# Patient Record
Sex: MALE | Race: WHITE | NOT HISPANIC OR LATINO | URBAN - METROPOLITAN AREA
[De-identification: names, ages, dates, MRNs, and addresses within clinical notes are randomized per-mention and may not be internally consistent; named-entity substitution may affect disease eponyms.]

---

## 2017-03-16 ENCOUNTER — INPATIENT (INPATIENT)
Facility: HOSPITAL | Age: 52
LOS: 0 days | Discharge: HOME | End: 2017-03-17
Attending: HOSPITALIST | Admitting: INTERNAL MEDICINE
Payer: COMMERCIAL

## 2017-03-16 VITALS
DIASTOLIC BLOOD PRESSURE: 96 MMHG | RESPIRATION RATE: 16 BRPM | HEART RATE: 72 BPM | SYSTOLIC BLOOD PRESSURE: 143 MMHG | TEMPERATURE: 98 F | OXYGEN SATURATION: 99 %

## 2017-03-16 LAB
ALBUMIN SERPL ELPH-MCNC: 4.5 G/DL — SIGNIFICANT CHANGE UP (ref 3.3–5)
ALP SERPL-CCNC: 71 U/L — SIGNIFICANT CHANGE UP (ref 40–120)
ALT FLD-CCNC: 36 U/L — SIGNIFICANT CHANGE UP (ref 12–78)
ANION GAP SERPL CALC-SCNC: 11 MMOL/L — SIGNIFICANT CHANGE UP (ref 5–17)
AST SERPL-CCNC: 32 U/L — SIGNIFICANT CHANGE UP (ref 15–37)
BASOPHILS # BLD AUTO: 0.1 K/UL — SIGNIFICANT CHANGE UP (ref 0–0.2)
BASOPHILS NFR BLD AUTO: 1.2 % — SIGNIFICANT CHANGE UP (ref 0–2)
BILIRUB SERPL-MCNC: 1.2 MG/DL — SIGNIFICANT CHANGE UP (ref 0.2–1.2)
BUN SERPL-MCNC: 18 MG/DL — SIGNIFICANT CHANGE UP (ref 7–23)
CALCIUM SERPL-MCNC: 8.9 MG/DL — SIGNIFICANT CHANGE UP (ref 8.5–10.1)
CHLORIDE SERPL-SCNC: 101 MMOL/L — SIGNIFICANT CHANGE UP (ref 96–108)
CO2 SERPL-SCNC: 27 MMOL/L — SIGNIFICANT CHANGE UP (ref 22–31)
CREAT SERPL-MCNC: 1.1 MG/DL — SIGNIFICANT CHANGE UP (ref 0.5–1.3)
EOSINOPHIL # BLD AUTO: 0.5 K/UL — SIGNIFICANT CHANGE UP (ref 0–0.5)
EOSINOPHIL NFR BLD AUTO: 4.7 % — SIGNIFICANT CHANGE UP (ref 0–6)
GLUCOSE SERPL-MCNC: 96 MG/DL — SIGNIFICANT CHANGE UP (ref 70–99)
HCT VFR BLD CALC: 50.4 % — HIGH (ref 39–50)
HGB BLD-MCNC: 17.4 G/DL — HIGH (ref 13–17)
LYMPHOCYTES # BLD AUTO: 2.5 K/UL — SIGNIFICANT CHANGE UP (ref 1–3.3)
LYMPHOCYTES # BLD AUTO: 24.8 % — SIGNIFICANT CHANGE UP (ref 13–44)
MCHC RBC-ENTMCNC: 30.9 PG — SIGNIFICANT CHANGE UP (ref 27–34)
MCHC RBC-ENTMCNC: 34.5 GM/DL — SIGNIFICANT CHANGE UP (ref 32–36)
MCV RBC AUTO: 89.7 FL — SIGNIFICANT CHANGE UP (ref 80–100)
MONOCYTES # BLD AUTO: 0.8 K/UL — SIGNIFICANT CHANGE UP (ref 0–0.9)
MONOCYTES NFR BLD AUTO: 8.3 % — SIGNIFICANT CHANGE UP (ref 2–14)
NEUTROPHILS # BLD AUTO: 6.1 K/UL — SIGNIFICANT CHANGE UP (ref 1.8–7.4)
NEUTROPHILS NFR BLD AUTO: 61 % — SIGNIFICANT CHANGE UP (ref 43–77)
PLATELET # BLD AUTO: 283 K/UL — SIGNIFICANT CHANGE UP (ref 150–400)
POTASSIUM SERPL-MCNC: 3.3 MMOL/L — LOW (ref 3.5–5.3)
POTASSIUM SERPL-SCNC: 3.3 MMOL/L — LOW (ref 3.5–5.3)
PROT SERPL-MCNC: 8.2 GM/DL — SIGNIFICANT CHANGE UP (ref 6–8.3)
RBC # BLD: 5.62 M/UL — SIGNIFICANT CHANGE UP (ref 4.2–5.8)
RBC # FLD: 11.2 % — SIGNIFICANT CHANGE UP (ref 10.3–14.5)
SODIUM SERPL-SCNC: 139 MMOL/L — SIGNIFICANT CHANGE UP (ref 135–145)
TROPONIN I SERPL-MCNC: <0.015 NG/ML — SIGNIFICANT CHANGE UP (ref 0.01–0.04)
WBC # BLD: 10 K/UL — SIGNIFICANT CHANGE UP (ref 3.8–10.5)
WBC # FLD AUTO: 10 K/UL — SIGNIFICANT CHANGE UP (ref 3.8–10.5)

## 2017-03-16 PROCEDURE — 93010 ELECTROCARDIOGRAM REPORT: CPT

## 2017-03-16 PROCEDURE — 99285 EMERGENCY DEPT VISIT HI MDM: CPT

## 2017-03-16 PROCEDURE — 71020: CPT | Mod: 26

## 2017-03-16 RX ORDER — POTASSIUM CHLORIDE 20 MEQ
40 PACKET (EA) ORAL ONCE
Qty: 0 | Refills: 0 | Status: COMPLETED | OUTPATIENT
Start: 2017-03-16 | End: 2017-03-16

## 2017-03-16 RX ADMIN — Medication 40 MILLIEQUIVALENT(S): at 23:48

## 2017-03-16 NOTE — ED PROVIDER NOTE - PROGRESS NOTE DETAILS
RANJITM: Spoke with dr adler covering for dr king. recommends admission to tele since pts symptoms are similar to prior MI. Initial ecg and trop normal

## 2017-03-16 NOTE — ED PROVIDER NOTE - OBJECTIVE STATEMENT
50 y/o male with a h/o CAD with 3 stents by Dr. Oneal (cardio), on metoprolol, anxiety on xanax PRN, c/o palpitations, frequent today. Pt reports similar sx in the past. He states he was anxious today due to his palpitations, had a near-syncopal episode. No CP, fevers, cough, or recent illnesses. Last stress test was 1 year ago, EF of 48-49%. Dr. Charles (cardio). 50 y/o male with a h/o CAD with 3 stents by Dr. Oneal (cardio), on metoprolol, anxiety on xanax PRN, c/o frequent episodes of palpitations today. Pt reports similar sx in the past. He states he was anxious today due to his palpitations, had a near-syncopal episode. No CP, fevers, cough, or recent illnesses. Last stress test was 1 year ago, EF of 48-49%. Dr. Charles (cardio).

## 2017-03-16 NOTE — ED PROVIDER NOTE - NS ED MD SCRIBE ATTENDING SCRIBE SECTIONS
RESULTS/DISPOSITION/PAST MEDICAL/SURGICAL/SOCIAL HISTORY/PHYSICAL EXAM/PROGRESS NOTE/HISTORY OF PRESENT ILLNESS/REVIEW OF SYSTEMS

## 2017-03-16 NOTE — ED ADULT NURSE NOTE - OBJECTIVE STATEMENT
Pt presents to ED c/o palpitations, anxiety, light headedness, SOB, and nausea since earlier today. Pt states he has hx of stents x 3 and HTN. Denies chest pain, vomiting, diarrhea, and recent infections/travel. Examined by MD Linder. Will continue to monitor.

## 2017-03-17 VITALS
SYSTOLIC BLOOD PRESSURE: 124 MMHG | RESPIRATION RATE: 17 BRPM | OXYGEN SATURATION: 96 % | HEART RATE: 60 BPM | DIASTOLIC BLOOD PRESSURE: 68 MMHG | TEMPERATURE: 98 F

## 2017-03-17 LAB
ANION GAP SERPL CALC-SCNC: 8 MMOL/L — SIGNIFICANT CHANGE UP (ref 5–17)
BUN SERPL-MCNC: 13 MG/DL — SIGNIFICANT CHANGE UP (ref 7–23)
CALCIUM SERPL-MCNC: 8.8 MG/DL — SIGNIFICANT CHANGE UP (ref 8.5–10.1)
CHLORIDE SERPL-SCNC: 105 MMOL/L — SIGNIFICANT CHANGE UP (ref 96–108)
CHOLEST SERPL-MCNC: 142 MG/DL — SIGNIFICANT CHANGE UP (ref 10–199)
CO2 SERPL-SCNC: 29 MMOL/L — SIGNIFICANT CHANGE UP (ref 22–31)
CREAT SERPL-MCNC: 1.15 MG/DL — SIGNIFICANT CHANGE UP (ref 0.5–1.3)
GLUCOSE SERPL-MCNC: 101 MG/DL — HIGH (ref 70–99)
HDLC SERPL-MCNC: 45 MG/DL — SIGNIFICANT CHANGE UP (ref 40–125)
LIPID PNL WITH DIRECT LDL SERPL: 80 MG/DL — SIGNIFICANT CHANGE UP
POTASSIUM SERPL-MCNC: 4.7 MMOL/L — SIGNIFICANT CHANGE UP (ref 3.5–5.3)
POTASSIUM SERPL-SCNC: 4.7 MMOL/L — SIGNIFICANT CHANGE UP (ref 3.5–5.3)
SODIUM SERPL-SCNC: 142 MMOL/L — SIGNIFICANT CHANGE UP (ref 135–145)
TOTAL CHOLESTEROL/HDL RATIO MEASUREMENT: 3.2 RATIO — LOW (ref 3.4–9.6)
TRIGL SERPL-MCNC: 87 MG/DL — SIGNIFICANT CHANGE UP (ref 10–149)
TROPONIN I SERPL-MCNC: <0.015 NG/ML — SIGNIFICANT CHANGE UP (ref 0.01–0.04)
TROPONIN I SERPL-MCNC: <0.015 NG/ML — SIGNIFICANT CHANGE UP (ref 0.01–0.04)
TSH SERPL-MCNC: 1.2 UIU/ML — SIGNIFICANT CHANGE UP (ref 0.36–3.74)

## 2017-03-17 PROCEDURE — 93010 ELECTROCARDIOGRAM REPORT: CPT

## 2017-03-17 RX ORDER — LISINOPRIL 2.5 MG/1
1 TABLET ORAL
Qty: 0 | Refills: 0 | COMMUNITY

## 2017-03-17 RX ORDER — ASPIRIN/CALCIUM CARB/MAGNESIUM 324 MG
81 TABLET ORAL DAILY
Qty: 0 | Refills: 0 | Status: DISCONTINUED | OUTPATIENT
Start: 2017-03-17 | End: 2017-03-17

## 2017-03-17 RX ORDER — ACETAMINOPHEN 500 MG
650 TABLET ORAL EVERY 6 HOURS
Qty: 0 | Refills: 0 | Status: DISCONTINUED | OUTPATIENT
Start: 2017-03-17 | End: 2017-03-17

## 2017-03-17 RX ORDER — CLOPIDOGREL BISULFATE 75 MG/1
1 TABLET, FILM COATED ORAL
Qty: 0 | Refills: 0 | COMMUNITY

## 2017-03-17 RX ORDER — ALPRAZOLAM 0.25 MG
1 TABLET ORAL
Qty: 0 | Refills: 0 | COMMUNITY

## 2017-03-17 RX ORDER — METOPROLOL TARTRATE 50 MG
50 TABLET ORAL AT BEDTIME
Qty: 0 | Refills: 0 | Status: DISCONTINUED | OUTPATIENT
Start: 2017-03-17 | End: 2017-03-17

## 2017-03-17 RX ORDER — ONDANSETRON 8 MG/1
4 TABLET, FILM COATED ORAL EVERY 6 HOURS
Qty: 0 | Refills: 0 | Status: DISCONTINUED | OUTPATIENT
Start: 2017-03-17 | End: 2017-03-17

## 2017-03-17 RX ORDER — ASPIRIN/CALCIUM CARB/MAGNESIUM 324 MG
1 TABLET ORAL
Qty: 0 | Refills: 0 | COMMUNITY

## 2017-03-17 RX ORDER — CLOPIDOGREL BISULFATE 75 MG/1
75 TABLET, FILM COATED ORAL DAILY
Qty: 0 | Refills: 0 | Status: DISCONTINUED | OUTPATIENT
Start: 2017-03-17 | End: 2017-03-17

## 2017-03-17 RX ORDER — METOPROLOL TARTRATE 50 MG
1 TABLET ORAL
Qty: 0 | Refills: 0 | COMMUNITY

## 2017-03-17 RX ORDER — ALPRAZOLAM 0.25 MG
0.25 TABLET ORAL THREE TIMES A DAY
Qty: 0 | Refills: 0 | Status: DISCONTINUED | OUTPATIENT
Start: 2017-03-17 | End: 2017-03-17

## 2017-03-17 RX ORDER — ATORVASTATIN CALCIUM 80 MG/1
1 TABLET, FILM COATED ORAL
Qty: 0 | Refills: 0 | COMMUNITY

## 2017-03-17 RX ORDER — ATORVASTATIN CALCIUM 80 MG/1
20 TABLET, FILM COATED ORAL AT BEDTIME
Qty: 0 | Refills: 0 | Status: DISCONTINUED | OUTPATIENT
Start: 2017-03-17 | End: 2017-03-17

## 2017-03-17 RX ORDER — LISINOPRIL 2.5 MG/1
10 TABLET ORAL DAILY
Qty: 0 | Refills: 0 | Status: DISCONTINUED | OUTPATIENT
Start: 2017-03-17 | End: 2017-03-17

## 2017-03-17 RX ORDER — SODIUM CHLORIDE 9 MG/ML
1000 INJECTION INTRAMUSCULAR; INTRAVENOUS; SUBCUTANEOUS
Qty: 0 | Refills: 0 | Status: DISCONTINUED | OUTPATIENT
Start: 2017-03-17 | End: 2017-03-17

## 2017-03-17 RX ADMIN — CLOPIDOGREL BISULFATE 75 MILLIGRAM(S): 75 TABLET, FILM COATED ORAL at 11:31

## 2017-03-17 RX ADMIN — Medication 50 MILLIGRAM(S): at 00:20

## 2017-03-17 RX ADMIN — ATORVASTATIN CALCIUM 20 MILLIGRAM(S): 80 TABLET, FILM COATED ORAL at 00:20

## 2017-03-17 RX ADMIN — LISINOPRIL 10 MILLIGRAM(S): 2.5 TABLET ORAL at 06:14

## 2017-03-17 RX ADMIN — Medication 81 MILLIGRAM(S): at 11:31

## 2017-03-17 RX ADMIN — SODIUM CHLORIDE 100 MILLILITER(S): 9 INJECTION INTRAMUSCULAR; INTRAVENOUS; SUBCUTANEOUS at 01:20

## 2017-03-17 NOTE — DISCHARGE NOTE ADULT - PATIENT PORTAL LINK FT
“You can access the FollowHealth Patient Portal, offered by [facility], by registering with the following website: [email or web address]”

## 2017-03-17 NOTE — H&P ADULT - NSHPPHYSICALEXAM_GEN_ALL_CORE
Vital Signs Last 24 Hrs  T(C): 36.4, Max: 36.4 (03-16 @ 20:30)  T(F): 97.6, Max: 97.6 (03-16 @ 20:30)  HR: 64 (64 - 72)  BP: 131/83 (131/83 - 143/96)  BP(mean): --  RR: 17 (16 - 17)  SpO2: 99% (99% - 99%)    GEN: appears comfortable  Neuro: AAOx3, nonfocal  HEENT: NC/AT, EOMI  Neck: no thyroidmegaly, no JVD  Cardiovascular: S1S2 present, regular rhythm, no murmur  Respiratory: breath sounds normal bilaterally, no wheezing, no rales, no rhonchi  Gastrointestinal: bowel sounds normal, soft, no abdominal tenderness  Musculoskeletal: no muscle tenderness  Extremities: No edema  Skin: No rash

## 2017-03-17 NOTE — CONSULT NOTE ADULT - ASSESSMENT
1. Palpitations. Likely due to PVC's which are chronic exacerbated by hypokalemia  2. CAD stable no evidence ACS  3. Anxiety  4. HTN good control  Plan: Patient stable for discharge on usual meds. follow up office.   thank you

## 2017-03-17 NOTE — H&P ADULT - ASSESSMENT
51 y.o. male with PMH CAD s/p MI s/p stents x3 (2014), HTN, HLD, anxiety presents with palpitations. Pt states palpitations that is associated with lightheadedness and dizziness.     #palpitations, near syncope  #dehydration  -admit to tele  -KIM  -check orthostatic vitals  -cont statin  -cardio consult, Dr Gu    #CAD s/p MI s/p stents  -cont meds    #hypokalemia  -replete potassium and monitor  -EKG: HR 85, NSR, T flattening III, AVF    #anxiety  -prn xanax

## 2017-03-17 NOTE — H&P ADULT - HISTORY OF PRESENT ILLNESS
51 y.o. male with PMH CAD s/p MI s/p stents x3 (2014), HTN, HLD, anxiety presents with palpitations. Pt states palpitations that is associated with lightheadedness and dizziness. States this AM, his HR 40s (he reports HR 40s-50s baseline). Pt reports drinking 3 beers last night and unsure if he took his PM meds. Denies any fever, CP, SOB, abd pain, diarrhea. Reports drinking 1/2 cup decaf and 1/2 cup regular coffee daily, unchanged from prior. Pt reports HR 70s-80s is high for him.    PMH: as above  PSH: right shoulder repair, stents  Family Hx: noncontrib to current presentation  Social Hx: former smoker, quit 2 yr ago, social EtOH, occupation - works for [employer]  ROS: as per HPI

## 2017-03-17 NOTE — DISCHARGE NOTE ADULT - MEDICATION SUMMARY - MEDICATIONS TO TAKE
I will START or STAY ON the medications listed below when I get home from the hospital:    aspirin 81 mg oral tablet, chewable  -- 1 tab(s) by mouth once a day  -- Indication: For CAD (coronary artery disease)    lisinopril 10 mg oral tablet  -- 1 tab(s) by mouth once a day  -- Indication: For htn    atorvastatin 20 mg oral tablet  -- 1 tab(s) by mouth once a day (at bedtime)  -- Indication: For dld    Plavix 75 mg oral tablet  -- 1 tab(s) by mouth once a day  -- Indication: For CAD (coronary artery disease)    Xanax 0.25 mg oral tablet  -- 1 tab(s) by mouth 3 times a day, As Needed  -- Indication: For anxiety    Toprol-XL 50 mg oral tablet, extended release  -- 1 tab(s) by mouth once a day (at bedtime)  -- Indication: For htn

## 2017-03-17 NOTE — DISCHARGE NOTE ADULT - CARE PLAN
Principal Discharge DX:	Palpitations  Goal:	activity as tolerated  Instructions for follow-up, activity and diet:	as above

## 2017-03-17 NOTE — DISCHARGE NOTE ADULT - HOSPITAL COURSE
Patient is a 51y old  Male who presents with a chief complaint of Pt states he was feeling an irregular heartbeat and his blood pressure was high. (17 Mar 2017 01:23)      SUBJECTIVE:   HPI:  51 y.o. male with PMH CAD s/p MI s/p stents x3 (2014), HTN, HLD, anxiety presents with palpitations. Pt states palpitations that is associated with lightheadedness and dizziness. States this AM, his HR 40s (he reports HR 40s-50s baseline). Pt reports drinking 3 beers last night and unsure if he took his PM meds. Denies any fever, CP, SOB, abd pain, diarrhea. Reports drinking 1/2 cup decaf and 1/2 cup regular coffee daily, unchanged from prior. Pt reports HR 70s-80s is high for him.  EKG wnl. CE neg x 3. No signs of ischemia. No events while on tele. PVCs likely from chronic hypokalemia. Plan to cont DAPT and home emds and dc to home.  PMH: as above  PSH: right shoulder repair, stents  Family Hx: noncontrib to current presentation  Social Hx: former smoker, quit 2 yr ago, social EtOH, occupation - works for [employer]  ROS: as per HPI (17 Mar 2017 00:25)              Vital Signs Last 24 Hrs  T(C): 36.7, Max: 36.7 (03-17 @ 10:30)  T(F): 98.1, Max: 98.1 (03-17 @ 10:30)  HR: 60 (50 - 72)  BP: 124/68 (124/68 - 144/95)  BP(mean): --  RR: 17 (16 - 18)  SpO2: 96% (96% - 99%)    I&O's Summary    I & Os for current day (as of 17 Mar 2017 13:33)  =============================================  IN: 979 ml / OUT: 0 ml / NET: 979 ml      CAPILLARY BLOOD GLUCOSE      PHYSICAL EXAM:    Constitutional: NAD, awake and alert, well-developed  HEENT: PERR, EOMI, Normal Hearing, MMM  Neck: Soft and supple, No LAD, No JVD  Respiratory: Breath sounds are clear bilaterally, No wheezing, rales or rhonchi  Cardiovascular: S1 and S2, regular rate and rhythm, no Murmurs, gallops or rubs  Gastrointestinal: Bowel Sounds present, soft, nontender, nondistended, no guarding, no rebound  Extremities: No peripheral edema  Vascular: 2+ peripheral pulses  Neurological: A/O x 3, no focal deficits  Musculoskeletal: 5/5 strength b/l upper and lower extremities  Skin: No rashes    MEDICATIONS:  MEDICATIONS  (STANDING):  metoprolol succinate ER 50milliGRAM(s) Oral at bedtime  atorvastatin 20milliGRAM(s) Oral at bedtime  sodium chloride 0.9%. 1000milliLiter(s) IV Continuous <Continuous>  aspirin  chewable 81milliGRAM(s) Oral daily  lisinopril 10milliGRAM(s) Oral daily  clopidogrel Tablet 75milliGRAM(s) Oral daily      LABS: All Labs Reviewed:                        17.4   10.0  )-----------( 283      ( 16 Mar 2017 19:50 )             50.4     17 Mar 2017 06:00    142    |  105    |  13     ----------------------------<  101    4.7     |  29     |  1.15     Ca    8.8        17 Mar 2017 06:00    TPro  8.2    /  Alb  4.5    /  TBili  1.2    /  DBili  x      /  AST  32     /  ALT  36     /  AlkPhos  71     16 Mar 2017 19:50      CARDIAC MARKERS ( 17 Mar 2017 06:00 )  <0.015 ng/mL / x     / x     / x     / x      CARDIAC MARKERS ( 17 Mar 2017 00:00 )  <0.015 ng/mL / x     / x     / x     / x      CARDIAC MARKERS ( 16 Mar 2017 19:50 )  <0.015 ng/mL / x     / x     / x     / x              Blood Culture:     RADIOLOGY/EKG:

## 2017-03-17 NOTE — CONSULT NOTE ADULT - SUBJECTIVE AND OBJECTIVE BOX
Patient is a 51y old  Male who presents with a chief complaint of Pt states he was feeling an irregular heartbeat and his blood pressure was high. (17 Mar 2017 01:23)      HPI:  51 y.o. male with PMH CAD s/p MI s/p stents x3 (2014), HTN, HLD, anxiety presents with palpitations. Pt states palpitations that is associated with lightheadedness and dizziness. States this AM, his HR 40s (he reports HR 40s-50s baseline). Pt reports drinking 3 beers last night and unsure if he took his PM meds. Denies any fever, CP, SOB, abd pain, diarrhea. Reports drinking 1/2 cup decaf and 1/2 cup regular coffee daily, unchanged from prior. Pt reports HR 70s-80s is high for him.  As above. patient well known to me . Has chronic intermittent palpitations and PVC. Prominent episode yesterday assoc with hypokalemia. no chest pain. Last nuc stress test aprrox one year ago.  PMH: as above  PSH: right shoulder repair, stents  Family Hx: noncontrib to current presentation  Social Hx: former smoker, quit 2 yr ago, social EtOH, occupation - works for [employer]  ROS: as per HPI (17 Mar 2017 00:25)      PAST MEDICAL & SURGICAL HISTORY:  CAD (coronary artery disease)      PREVIOUS DIAGNOSTIC TESTING:      ECHO  FINDINGS:    STRESS  FINDINGS:    CATHETERIZATION  FINDINGS:    MEDICATIONS  (STANDING):  metoprolol succinate ER 50milliGRAM(s) Oral at bedtime  atorvastatin 20milliGRAM(s) Oral at bedtime  sodium chloride 0.9%. 1000milliLiter(s) IV Continuous <Continuous>  aspirin  chewable 81milliGRAM(s) Oral daily  lisinopril 10milliGRAM(s) Oral daily  clopidogrel Tablet 75milliGRAM(s) Oral daily    MEDICATIONS  (PRN):  acetaminophen   Tablet 650milliGRAM(s) Oral every 6 hours PRN For Temp greater than 38 C (100.4 F)  ondansetron Injectable 4milliGRAM(s) IV Push every 6 hours PRN Nausea  ALPRAZolam 0.25milliGRAM(s) Oral three times a day PRN anxiety      FAMILY HISTORY:      SOCIAL HISTORY:  ***    REVIEW OF SYSTEM:  Pertinent items are noted in HPI.  Constitutional neg  Eyes:    Ears, nose, mouth,   throat, and face:    Neck:   Respiratory:  no dyspnea or cough    Cardiovascular:    Gastrointestinal:no nasea or vomiting  Genitourinary:     Hematologic/lymphatic:   Musculoskeletal:   Neurological:   Behavioral/Psych:        Vital Signs Last 24 Hrs  T(C): 36.6, Max: 36.6 (03-17 @ 06:00)  T(F): 97.8, Max: 97.8 (03-17 @ 06:00)  HR: 50 (50 - 72)  BP: 132/84 (131/83 - 144/95)  BP(mean): --  RR: 17 (16 - 18)  SpO2: 98% (97% - 99%)    I&O's Summary    I & Os for current day (as of 17 Mar 2017 08:03)  =============================================  IN: 979 ml / OUT: 0 ml / NET: 979 ml    PHYSICAL EXAM  General Appearance: cooperative, no acute distress,   HEENT: PERRL, conjunctiva clear, EOM's intact,   Neck: Supple, , no adenopathy, thyroid: not enlarged, no carotid bruit or JVD  Back: Symmetric, no  tenderness,no soft tissue tenderness  Lungs: Clear to auscultation bilaterally,no adventitious breath sounds, normal   expiratory phase  Heart: Regular rate and rhythm, S1, S2 normal, no murmur,   Abdomen: Soft, non-tender, bowel sounds active , no hepatosplenomegaly  Extremities: no cyanosis or edema, no joint swelling  Skin: Skin color, texture normal, no rashes   Neurologic: Alert and oriented X3 ,        INTERPRETATION OF TELEMETRY:    ECG:Sinus blane. old [facility]         LABS:                          17.4   10.0  )-----------( 283      ( 16 Mar 2017 19:50 )             50.4     17 Mar 2017 06:00    142    |  105    |  13     ----------------------------<  101    4.7     |  29     |  1.15     Ca    8.8        17 Mar 2017 06:00    TPro  8.2    /  Alb  4.5    /  TBili  1.2    /  DBili  x      /  AST  32     /  ALT  36     /  AlkPhos  71     16 Mar 2017 19:50    CARDIAC MARKERS ( 17 Mar 2017 06:00 )  <0.015 ng/mL / x     / x     / x     / x      CARDIAC MARKERS ( 17 Mar 2017 00:00 )  <0.015 ng/mL / x     / x     / x     / x      CARDIAC MARKERS ( 16 Mar 2017 19:50 )  <0.015 ng/mL / x     / x     / x     / x                        RADIOLOGY & ADDITIONAL STUDIES:    IMPRESSION:    PLAN:

## 2017-03-20 DIAGNOSIS — I49.3 VENTRICULAR PREMATURE DEPOLARIZATION: ICD-10-CM

## 2017-03-20 DIAGNOSIS — Z95.5 PRESENCE OF CORONARY ANGIOPLASTY IMPLANT AND GRAFT: ICD-10-CM

## 2017-03-20 DIAGNOSIS — E87.6 HYPOKALEMIA: ICD-10-CM

## 2017-03-20 DIAGNOSIS — E86.0 DEHYDRATION: ICD-10-CM

## 2017-03-20 DIAGNOSIS — E78.5 HYPERLIPIDEMIA, UNSPECIFIED: ICD-10-CM

## 2017-03-20 DIAGNOSIS — I25.10 ATHEROSCLEROTIC HEART DISEASE OF NATIVE CORONARY ARTERY WITHOUT ANGINA PECTORIS: ICD-10-CM

## 2017-03-20 DIAGNOSIS — Z87.891 PERSONAL HISTORY OF NICOTINE DEPENDENCE: ICD-10-CM

## 2017-03-20 DIAGNOSIS — I25.2 OLD MYOCARDIAL INFARCTION: ICD-10-CM

## 2017-03-20 DIAGNOSIS — R00.2 PALPITATIONS: ICD-10-CM

## 2017-03-20 DIAGNOSIS — F41.9 ANXIETY DISORDER, UNSPECIFIED: ICD-10-CM

## 2017-03-20 DIAGNOSIS — I10 ESSENTIAL (PRIMARY) HYPERTENSION: ICD-10-CM

## 2019-10-06 ENCOUNTER — EMERGENCY (EMERGENCY)
Facility: HOSPITAL | Age: 54
LOS: 0 days | Discharge: HOME | End: 2019-10-07
Attending: EMERGENCY MEDICINE
Payer: COMMERCIAL

## 2019-10-06 VITALS — HEIGHT: 71 IN | WEIGHT: 214.95 LBS

## 2019-10-06 DIAGNOSIS — I25.10 ATHEROSCLEROTIC HEART DISEASE OF NATIVE CORONARY ARTERY WITHOUT ANGINA PECTORIS: ICD-10-CM

## 2019-10-06 DIAGNOSIS — R20.2 PARESTHESIA OF SKIN: ICD-10-CM

## 2019-10-06 DIAGNOSIS — F41.9 ANXIETY DISORDER, UNSPECIFIED: ICD-10-CM

## 2019-10-06 DIAGNOSIS — I10 ESSENTIAL (PRIMARY) HYPERTENSION: ICD-10-CM

## 2019-10-06 DIAGNOSIS — Z79.82 LONG TERM (CURRENT) USE OF ASPIRIN: ICD-10-CM

## 2019-10-06 DIAGNOSIS — Z79.899 OTHER LONG TERM (CURRENT) DRUG THERAPY: ICD-10-CM

## 2019-10-06 DIAGNOSIS — Z95.5 PRESENCE OF CORONARY ANGIOPLASTY IMPLANT AND GRAFT: ICD-10-CM

## 2019-10-06 PROCEDURE — 93010 ELECTROCARDIOGRAM REPORT: CPT

## 2019-10-06 PROCEDURE — 84484 ASSAY OF TROPONIN QUANT: CPT

## 2019-10-06 PROCEDURE — 80053 COMPREHEN METABOLIC PANEL: CPT

## 2019-10-06 PROCEDURE — 36415 COLL VENOUS BLD VENIPUNCTURE: CPT

## 2019-10-06 PROCEDURE — 99284 EMERGENCY DEPT VISIT MOD MDM: CPT

## 2019-10-06 PROCEDURE — 85610 PROTHROMBIN TIME: CPT

## 2019-10-06 PROCEDURE — 93005 ELECTROCARDIOGRAM TRACING: CPT

## 2019-10-06 PROCEDURE — 85730 THROMBOPLASTIN TIME PARTIAL: CPT

## 2019-10-06 PROCEDURE — 85027 COMPLETE CBC AUTOMATED: CPT

## 2019-10-06 PROCEDURE — 82962 GLUCOSE BLOOD TEST: CPT

## 2019-10-06 NOTE — ED PROVIDER NOTE - PROGRESS NOTE DETAILS
pt and spouse told of results.   pt states feels better and denies any current symptoms.   pt states that he prefers to be d.c home now and will f/u with PMD in the AM.   prefers not to have repeat CE in ED.

## 2019-10-06 NOTE — ED PROVIDER NOTE - NSFOLLOWUPINSTRUCTIONS_ED_ALL_ED_FT
please follow up with your doctor in 2-3 days.   continue with medications as prescribed.    return to ED for any concerns please follow up with your doctor in 1-2 days.   continue with medications as prescribed.    return to ED for any concerns

## 2019-10-06 NOTE — ED ADULT TRIAGE NOTE - CHIEF COMPLAINT QUOTE
hypertension tonight, 180/91 on home monitor. denies chest pain or discomfort. states he feels shaky. no respiratory distress.  history of 3 stents 5 years ago.   home medications: plavix, atorvastatin, lisinopril, bystolic

## 2019-10-06 NOTE — ED PROVIDER NOTE - OBJECTIVE STATEMENT
55 y/o male in ED c/o numbness and tingling to lips, hands and feet tonight after eating carbs.    pt states took his BP at home and found to be high.   pt states became anxious followed by increasing BP.   pt states has been told be his PMD to avoid carbs as he usually gets GI upset with intake.   pt states usually gets epigastric fullness with carb intake and had bread and pasta tonight.    pt denies any fever, HA, cp, sob, n/v/d.    no sick contacts or recent travel.    states taking his BP meds as prescribed.

## 2019-10-06 NOTE — ED PROVIDER NOTE - CLINICAL SUMMARY MEDICAL DECISION MAKING FREE TEXT BOX
pt with episode of numbness and tingling to lips, hands, and feet tonight.   h/o CAD and stents.    elevated BP.   will check EKG, labs, observe and reeval.  possible anxiety.

## 2019-10-07 VITALS
OXYGEN SATURATION: 100 % | SYSTOLIC BLOOD PRESSURE: 168 MMHG | HEART RATE: 62 BPM | DIASTOLIC BLOOD PRESSURE: 97 MMHG | TEMPERATURE: 98 F | RESPIRATION RATE: 16 BRPM

## 2019-10-07 PROBLEM — I25.10 ATHEROSCLEROTIC HEART DISEASE OF NATIVE CORONARY ARTERY WITHOUT ANGINA PECTORIS: Chronic | Status: ACTIVE | Noted: 2017-03-16

## 2019-10-07 LAB
ALBUMIN SERPL ELPH-MCNC: 3.9 G/DL — SIGNIFICANT CHANGE UP (ref 3.3–5)
ALP SERPL-CCNC: 69 U/L — SIGNIFICANT CHANGE UP (ref 40–120)
ALT FLD-CCNC: 35 U/L — SIGNIFICANT CHANGE UP (ref 12–78)
ANION GAP SERPL CALC-SCNC: 8 MMOL/L — SIGNIFICANT CHANGE UP (ref 5–17)
APTT BLD: 32.3 SEC — SIGNIFICANT CHANGE UP (ref 27.5–36.3)
AST SERPL-CCNC: 27 U/L — SIGNIFICANT CHANGE UP (ref 15–37)
BILIRUB SERPL-MCNC: 0.6 MG/DL — SIGNIFICANT CHANGE UP (ref 0.2–1.2)
BUN SERPL-MCNC: 18 MG/DL — SIGNIFICANT CHANGE UP (ref 7–23)
CALCIUM SERPL-MCNC: 8.9 MG/DL — SIGNIFICANT CHANGE UP (ref 8.5–10.1)
CHLORIDE SERPL-SCNC: 104 MMOL/L — SIGNIFICANT CHANGE UP (ref 96–108)
CO2 SERPL-SCNC: 25 MMOL/L — SIGNIFICANT CHANGE UP (ref 22–31)
CREAT SERPL-MCNC: 1.2 MG/DL — SIGNIFICANT CHANGE UP (ref 0.5–1.3)
GLUCOSE SERPL-MCNC: 94 MG/DL — SIGNIFICANT CHANGE UP (ref 70–99)
HCT VFR BLD CALC: 46.3 % — SIGNIFICANT CHANGE UP (ref 39–50)
HGB BLD-MCNC: 15.9 G/DL — SIGNIFICANT CHANGE UP (ref 13–17)
INR BLD: 0.99 RATIO — SIGNIFICANT CHANGE UP (ref 0.88–1.16)
MCHC RBC-ENTMCNC: 31.2 PG — SIGNIFICANT CHANGE UP (ref 27–34)
MCHC RBC-ENTMCNC: 34.3 GM/DL — SIGNIFICANT CHANGE UP (ref 32–36)
MCV RBC AUTO: 91 FL — SIGNIFICANT CHANGE UP (ref 80–100)
PLATELET # BLD AUTO: 213 K/UL — SIGNIFICANT CHANGE UP (ref 150–400)
POTASSIUM SERPL-MCNC: 3.9 MMOL/L — SIGNIFICANT CHANGE UP (ref 3.5–5.3)
POTASSIUM SERPL-SCNC: 3.9 MMOL/L — SIGNIFICANT CHANGE UP (ref 3.5–5.3)
PROT SERPL-MCNC: 7.5 GM/DL — SIGNIFICANT CHANGE UP (ref 6–8.3)
PROTHROM AB SERPL-ACNC: 11 SEC — SIGNIFICANT CHANGE UP (ref 10–12.9)
RBC # BLD: 5.09 M/UL — SIGNIFICANT CHANGE UP (ref 4.2–5.8)
RBC # FLD: 12.1 % — SIGNIFICANT CHANGE UP (ref 10.3–14.5)
SODIUM SERPL-SCNC: 137 MMOL/L — SIGNIFICANT CHANGE UP (ref 135–145)
TROPONIN I SERPL-MCNC: <0.015 NG/ML — SIGNIFICANT CHANGE UP (ref 0.01–0.04)
WBC # BLD: 5.53 K/UL — SIGNIFICANT CHANGE UP (ref 3.8–10.5)
WBC # FLD AUTO: 5.53 K/UL — SIGNIFICANT CHANGE UP (ref 3.8–10.5)

## 2019-10-07 NOTE — ED ADULT NURSE NOTE - OBJECTIVE STATEMENT
presents to the ED with hypertension. pt states that his BP was 180/100 on home monitor. denies symptoms.

## 2020-01-11 ENCOUNTER — EMERGENCY (EMERGENCY)
Facility: HOSPITAL | Age: 55
LOS: 0 days | Discharge: HOME | End: 2020-01-11
Attending: STUDENT IN AN ORGANIZED HEALTH CARE EDUCATION/TRAINING PROGRAM
Payer: COMMERCIAL

## 2020-01-11 VITALS
RESPIRATION RATE: 16 BRPM | TEMPERATURE: 98 F | DIASTOLIC BLOOD PRESSURE: 98 MMHG | HEART RATE: 57 BPM | OXYGEN SATURATION: 99 % | SYSTOLIC BLOOD PRESSURE: 176 MMHG

## 2020-01-11 VITALS — WEIGHT: 210.1 LBS
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
## 2020-01-11 DIAGNOSIS — S82.891A OTHER FRACTURE OF RIGHT LOWER LEG, INITIAL ENCOUNTER FOR CLOSED FRACTURE: ICD-10-CM

## 2020-01-11 DIAGNOSIS — Z79.02 LONG TERM (CURRENT) USE OF ANTITHROMBOTICS/ANTIPLATELETS: ICD-10-CM

## 2020-01-11 DIAGNOSIS — Z79.82 LONG TERM (CURRENT) USE OF ASPIRIN: ICD-10-CM

## 2020-01-11 DIAGNOSIS — Z91.013 ALLERGY TO SEAFOOD: ICD-10-CM

## 2020-01-11 DIAGNOSIS — I25.10 ATHEROSCLEROTIC HEART DISEASE OF NATIVE CORONARY ARTERY WITHOUT ANGINA PECTORIS: ICD-10-CM

## 2020-01-11 DIAGNOSIS — S99.911A UNSPECIFIED INJURY OF RIGHT ANKLE, INITIAL ENCOUNTER: ICD-10-CM
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
## 2020-01-11 PROCEDURE — 29515 APPLICATION SHORT LEG SPLINT: CPT | Mod: RT

## 2020-01-11 PROCEDURE — 29515 APPLICATION SHORT LEG SPLINT: CPT

## 2020-01-11 PROCEDURE — 73610 X-RAY EXAM OF ANKLE: CPT | Mod: RT

## 2020-01-11 PROCEDURE — 99283 EMERGENCY DEPT VISIT LOW MDM: CPT | Mod: 25

## 2020-01-11 PROCEDURE — 73610 X-RAY EXAM OF ANKLE: CPT | Mod: 26,RT

## 2020-01-11 PROCEDURE — 99283 EMERGENCY DEPT VISIT LOW MDM: CPT

## 2020-01-11 NOTE — ED STATDOCS - NSFOLLOWUPINSTRUCTIONS_ED_ALL_ED_FT
follow up with your orthopedist    Splint Care    WHAT YOU NEED TO KNOW:    Splint care is important to help protect your splint until it comes off. Some splints are made of fiberglass or plaster that will need to dry and harden. Splint care will help the splint dry and harden correctly. Even after your splint hardens, it can be damaged.    DISCHARGE INSTRUCTIONS:    Return to the emergency department if:     You have increased pain.      Your fingers or toes are numb or tingling.      You feel burning or stinging around your injury.      Your nails, fingers, or toes turn pale, blue, or gray, and feel cold.      You have new or increased trouble moving your fingers or toes.      Your swelling gets worse.      The skin under your splint is bleeding or leaking pus.     Contact your healthcare provider if:     Your hard splint gets wet or is damaged.      You have a fever.      Your splint feels tighter.      You have itchy, dry skin under your splint that is getting worse.      The skin under your splint is red, or you have a new sore.      You notice a bad smell coming from your splint.       You have questions or concerns about your condition or care.    How to care for your splint:     Wait for your hard splint to harden completely. You may have to wait up to 3 days before you can walk on a plaster splint.      Check your splint and the skin around it each day. Check your splint for damage, such as cracks and breaks. Check your skin for redness, increased swelling, and sores. Loosen the elastic bandage around your splint if it feels too tight.      Keep your splint clean and dry. Keep dirt out of your splint. Before you bathe, wrap your hard splint with 2 layers of plastic. Then put a plastic bag over it. Keep the plastic bag tightly sealed. You can also ask your healthcare provider about waterproof shields. Do not put your hard splint in the water, even with a plastic bag over it. A wet splint can make your skin itchy, and may lead to infection.      Do not put powders or deodorants inside your splint. These can dry your skin and increase itching.       Do not try to scratch the skin inside your hard splint with sharp objects. Sharp objects can break off inside your splint or hurt your skin.       Do not pull the padding out of your splint. The padding inside your splint protects your skin. You may develop a sore on your skin if you take out the padding.    Follow up with your healthcare provider as directed within 1 to 2 weeks: Write down your questions so you remember to ask them during your visits.

## 2020-01-11 NOTE — ED STATDOCS - OBJECTIVE STATEMENT
55 y/o male with a PMHx of CAD presents to the ED s/p right ankle injury. Pt reports he was jogging, tripped and fell. +right ankle pain. No other complaints at this time.

## 2020-01-11 NOTE — ED STATDOCS - ATTENDING CONTRIBUTION TO CARE
I, Leatha Sung DO,  performed the initial face to face bedside interview with this patient regarding history of present illness, review of symptoms and relevant past medical, social and family history.  I completed an independent physical examination.  I was the initial provider who evaluated this patient. I have signed out the follow up of any pending tests (i.e. labs, radiological studies) to the ACP.  I have communicated the patient’s plan of care and disposition with the ACP.  The history, relevant review of systems, past medical and surgical history, medical decision making, and physical examination was documented by the scribe in my presence and I attest to the accuracy of the documentation.

## 2020-01-11 NOTE — ED ADULT TRIAGE NOTE - CHIEF COMPLAINT QUOTE
pt c/o right ankle injury that occurred while jogging today. pt has swelling and pain to right ankle. pt refused wheelchair

## 2020-01-11 NOTE — ED STATDOCS - PATIENT PORTAL LINK FT
You can access the FollowMyHealth Patient Portal offered by [facility] by registering at the following website: [email or web address]. By joining [facility]’s FollowMyHealth portal, you will also be able to view your health information using other applications (apps) compatible with our system.

## 2020-01-11 NOTE — ED STATDOCS - PROGRESS NOTE DETAILS
Patient seen and evaluated.  Questionable fx avulsion on superior talus with severe lateral swelling.  Placed in splint, non weight bearing, ortho follow up, crutch use, demonstrates ability to use well.  -Maryjane Valdivia PA-C

## 2020-01-11 NOTE — ED ADULT NURSE NOTE - NSIMPLEMENTINTERV_GEN_ALL_ED
Implemented All Fall Risk Interventions:  [place] to call system. Call bell, personal items and telephone within reach. Instruct patient to call for assistance. Room bathroom lighting operational. Non-slip footwear when patient is off stretcher. Physically safe environment: no spills, clutter or unnecessary equipment. Stretcher in lowest position, wheels locked, appropriate side rails in place. Provide visual cue, wrist band, yellow gown, etc. Monitor gait and stability. Monitor for mental status changes and reorient to person, place, and time. Review medications for side effects contributing to fall risk. Reinforce activity limits and safety measures with patient and family.

## 2020-01-11 NOTE — ED ADULT NURSE NOTE - OBJECTIVE STATEMENT
Patient presents to ED complaining of ankle pain. Patient complaining of R ankle pain. Patient sattes he rolled ankle while jogging this morning, patient ambulatory on arrival. C/o increased pain with bearing weight.

## 2021-09-19 ENCOUNTER — TRANSCRIPTION ENCOUNTER (OUTPATIENT)
Age: 56
End: 2021-09-19

## 2022-04-11 ENCOUNTER — APPOINTMENT (OUTPATIENT)
Dept: ORTHOPEDIC SURGERY | Facility: CLINIC | Age: 57
End: 2022-04-11
Payer: COMMERCIAL

## 2022-04-11 VITALS — WEIGHT: 218 LBS | HEIGHT: 71 IN | BODY MASS INDEX: 30.52 KG/M2

## 2022-04-11 DIAGNOSIS — Z78.9 OTHER SPECIFIED HEALTH STATUS: ICD-10-CM

## 2022-04-11 DIAGNOSIS — Z87.891 PERSONAL HISTORY OF NICOTINE DEPENDENCE: ICD-10-CM

## 2022-04-11 DIAGNOSIS — I10 ESSENTIAL (PRIMARY) HYPERTENSION: ICD-10-CM

## 2022-04-11 PROCEDURE — 99214 OFFICE O/P EST MOD 30 MIN: CPT

## 2022-04-11 PROCEDURE — 73564 X-RAY EXAM KNEE 4 OR MORE: CPT | Mod: LT

## 2022-04-11 NOTE — PHYSICAL EXAM
[Left] : left knee [AP] : anteroposterior [Lateral] : lateral [Cookson] : skyline [AP Standing] : anteroposterior standing [de-identified] : The patient is a well appearing 56 year old M of their stated age.\par Patient ambulates with a normal gait.\par Negative straight leg raise bilateral\par \par Effected Knee: Left                   	\par ROM:  0-120 degrees\par Lachman: Negative\par Pivot Shift: Negative\par Anterior Drawer: Negative\par Posterior Drawer / Sag:Negative\par Varus Stress 0 degrees: unstable\par Varus Stress 30 degrees: unstable \par Valgus Stress 0 degrees: Stable\par Valgus Stress 30 degrees: Stable\par Medial Madi: Negative\par Lateral Madi: Negative\par Patella Glide: 2+\par Patella Apprehension: Negative\par Patella Grind: +\par Palpation:\par Medial Joint Line: Nontender\par Lateral Joint Line: tender\par Medial Collateral Ligament: Nontender\par Lateral Collateral Ligament/PLC: Nontender\par Distal Femur: Nontender\par Proximal Tibia: Nontender\par Tibial Tubercle: Nontender\par Distal Pole Patella: Nontender\par Quadriceps Tendon: Nontender &  Intact\par Patella Tendon: Nontender &  Intact\par Medial Distal Hamstring/PES: Nontender\par Lateral Distal Hamstring: Nontender & Stable\par Iliotibial Band: Nontender\par Medial Patellofemoral Ligament: Nontender\par Adductor: Nontender\par Proximal GSC-Plantaris: Nontender\par Calf: Supple & Nontender\par Inspection:\par Deformity: No\par Erythema: No\par Ecchymosis: No\par Abrasions: No\par Effusion: No\par Prepatella Bursitis: No\par Neurologic Exam:\par Sensation L4-S1: Grossly Intact\par Motor Exam:\par Quadriceps: 5 out of 5\par Hamstrings: 5 out of 5\par EHL: 5 out of 5\par FHL: 5 out of 5\par TA: 5 out of 5\par GS: 5 out of 5\par Circulatory/Pulses:\par Dorsalis Pedis: 2+\par Posterior Tibialis: 2+\par Additional Pertinent Findings: None\par Contralateral Knee:                           	\par ROM: 0-145 degrees\par Other Pertinent Findings: None\par \par Assessment: The patient is a 56 year old M with left knee pain and radiographic and physical exam findings consistent with end stage left knee OA\par  \par The patient’s condition is acute\par Documents/Results Reviewed Today: Xray left knee\par Tests/Studies Independently Interpreted Today: Xray left knee shows advanced lateral compartment narrowing and moderate PF OA\par Pertinent findings include: 0-120, LJLT, patella grind, Varus instability at 0 and 30deg\par Confounding medical conditions/concerns: none\par \par Plan:  Discussed with patient that they will eventually need a TKA. Given info on reparel knee sleeve. 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\par Additional Instructions: activity modifications during flare ups\par Follow-Up: prn\par \par The patient's current medication management of their orthopedic diagnosis was reviewed today:\par (1) We discussed a comprehensive treatment plan that included possible pharmaceutical management involving the use of prescription strength medications including but not limited to options such as oral Naprosyn 500mg BID, once daily Meloxicam 15 mg, or 500-650 mg Tylenol versus over the counter oral medications and topical prescription NSAID Pennsaid vs over the counter Voltaren gel.\par (2) There is a moderate risk of morbidity with further treatment, especially from use of prescription strength medications and possible side effects of these medications which include upset stomach with oral medications, skin reactions to topical medications and cardiac/renal issues with long term use.\par (3) I recommended that the patient follow-up with their medical physician to discuss any significant specific potential issues with long term medication use such as interactions with current medications or with exacerbation of underlying medical comorbidities.\par (4) The benefits and risks associated with use of injectable, oral or topical, prescription and over the counter anti-inflammatory medications were discussed with the patient. The patient voiced understanding of the risks including but not limited to bleeding, stroke, kidney dysfunction, heart disease, and were referred to the black box warning label for further information.\par \par DMITRY, Michael Orellana, attest that this documentation has been prepared under the direction and in the presence of Provider Dr. Frye\par \par The documentation recorded by the scribe accurately reflects the service I personally performed and the decisions made by me.\par  [FreeTextEntry9] : advanced lateral compartment narrowing and moderate PF OA

## 2022-04-11 NOTE — HISTORY OF PRESENT ILLNESS
[de-identified] : The patient is a 56 year old right hand dominant male  who presents today complaining of left knee pain.  \par Date of Injury/Onset: chronic\par Pain:    At Rest: 5/10 \par With Activity:  6/10 \par Mechanism of injury: Pt has a history of knee pain with the left knee.\par This is not a Work Related Injury being treated under Worker's Compensation.\par This is not an athletic injury occurring associated with an interscholastic or organized sports team.\par Quality of symptoms: popping, clicking, pain with activity\par Improves with: rest\par Worse with: overuse, ADL's\par Prior treatment: has seen Dr. Frye at [facility]\par Prior Imaging: none\par Reports Available For Review Today: none\par Out of work/sport: currently working\par School/Sport/Position/Occupation:  for [employer]\par Additional Information: [None]

## 2022-04-11 NOTE — REVIEW OF SYSTEMS
[Negative] : Heme/Lymph airway patent/breath sounds equal/good air movement/respirations non-labored/no chest wall tenderness/no intercostal retractions

## 2022-04-27 ENCOUNTER — APPOINTMENT (OUTPATIENT)
Dept: ORTHOPEDIC SURGERY | Facility: CLINIC | Age: 57
End: 2022-04-27
Payer: COMMERCIAL

## 2022-04-27 VITALS — BODY MASS INDEX: 30.52 KG/M2 | HEIGHT: 71 IN | WEIGHT: 218 LBS

## 2022-04-27 DIAGNOSIS — E78.00 PURE HYPERCHOLESTEROLEMIA, UNSPECIFIED: ICD-10-CM

## 2022-04-27 DIAGNOSIS — M25.542 PAIN IN JOINTS OF LEFT HAND: ICD-10-CM

## 2022-04-27 DIAGNOSIS — I51.9 HEART DISEASE, UNSPECIFIED: ICD-10-CM

## 2022-04-27 DIAGNOSIS — M77.8 OTHER ENTHESOPATHIES, NOT ELSEWHERE CLASSIFIED: ICD-10-CM

## 2022-04-27 PROCEDURE — 99214 OFFICE O/P EST MOD 30 MIN: CPT

## 2022-04-27 PROCEDURE — 73130 X-RAY EXAM OF HAND: CPT | Mod: LT

## 2022-04-27 RX ORDER — CLOPIDOGREL BISULFATE 300 MG/1
TABLET, FILM COATED ORAL
Refills: 0 | Status: ACTIVE | COMMUNITY

## 2022-04-27 RX ORDER — CLOPIDOGREL 75 MG/1
TABLET, FILM COATED ORAL
Refills: 0 | Status: ACTIVE | COMMUNITY

## 2022-04-27 RX ORDER — LISINOPRIL 30 MG/1
TABLET ORAL
Refills: 0 | Status: ACTIVE | COMMUNITY

## 2022-04-27 RX ORDER — ATORVASTATIN CALCIUM 80 MG/1
TABLET, FILM COATED ORAL
Refills: 0 | Status: ACTIVE | COMMUNITY

## 2022-04-27 NOTE — IMAGING
[de-identified] : LEFT HAND EXAM\par \par +ttp along MF EDC and proximal into musculotendinous junction. Pain with resisted finger extension.\par Skin intact\par No deformity, edema, ecchymosis\par Warm and well perfused\par Brisk capillary refill throughout\par Motor function intact AIN, PIN, and ulnar nerves\par Sensation intact to light touch in median, ulnar, and radial nerves\par Strength with , finger abduction, wrist flexion, wrist extension 5/5\par Finger and wrist motion is intact and full\par Patient is able to make a composite fist\par \par \par Left hand radiographs with no fracture nor dislocation. No arthrosis.

## 2022-04-27 NOTE — ASSESSMENT
[FreeTextEntry1] : Left MF extensor tendon tendonitis - reviewed radiographs and pathoanatomy with patient. Discussed management will consist of NSAIDs prn, brace prn, OT.\par \par F/u prn

## 2022-04-27 NOTE — HISTORY OF PRESENT ILLNESS
[de-identified] : 57M, RHD, PMHX of heart disease presents with left hand pain  for approx 6 weeks. Reports having a bump on the back of his hand, was doing push ups and started to feel pain. Reports he was splitting wood and felt a pop and now it disappeared. Admits to since then having a lot of pain, difficulty with ROM.  Pain radiates up into forearm to the elbow.

## 2022-05-25 ENCOUNTER — APPOINTMENT (OUTPATIENT)
Dept: ORTHOPEDIC SURGERY | Facility: CLINIC | Age: 57
End: 2022-05-25

## 2022-10-06 ENCOUNTER — APPOINTMENT (OUTPATIENT)
Dept: ORTHOPEDIC SURGERY | Facility: CLINIC | Age: 57
End: 2022-10-06

## 2022-10-06 VITALS — BODY MASS INDEX: 30.52 KG/M2 | HEIGHT: 71 IN | WEIGHT: 218 LBS

## 2022-10-06 PROCEDURE — 99213 OFFICE O/P EST LOW 20 MIN: CPT

## 2022-10-07 NOTE — HISTORY OF PRESENT ILLNESS
[de-identified] : The patient is a 57 year old right hand dominant male  who presents today complaining of left knee pain.  \par Date of Injury/Onset: chronic - over 6 months \par Pain:    At Rest: 2/10 \par With Activity:  8/10 \par Mechanism of injury: Pt has a history of knee pain with the left knee.\par This is not a Work Related Injury being treated under Worker's Compensation.\par This is not an athletic injury occurring associated with an interscholastic or organized sports team.\par Quality of symptoms: popping, clicking, pain with activity\par Improves with: rest\par Worse with: overuse, ADL's\par Treatment/Imaging/Studies Since Last Visit: HEP\par 	Reports Available For Review Today: none\par Out of work/sport: currently working\par School/Sport/Position/Occupation:  for [employer]\par Additional Information: [None]

## 2022-10-07 NOTE — IMAGING
[de-identified] : The patient is a well appearing 56 year old M of their stated age.\par Patient ambulates with a normal gait.\par Negative straight leg raise bilateral\par \par Effected Knee: Left                   	\par ROM:  0-125 degrees\par Lachman: Negative\par Pivot Shift: Negative\par Anterior Drawer: Negative\par Posterior Drawer / Sag:Negative\par Varus Stress 0 degrees: unstable\par Varus Stress 30 degrees: unstable \par Valgus Stress 0 degrees: Stable\par Valgus Stress 30 degrees: Stable\par Medial Madi: Negative\par Lateral Madi: Negative\par Patella Glide: 2+\par Patella Apprehension: Negative\par Patella Grind: +\par Palpation:\par Medial Joint Line: Nontender\par Lateral Joint Line: tender\par Medial Collateral Ligament: Nontender\par Lateral Collateral Ligament/PLC: Nontender\par Distal Femur: Nontender\par Proximal Tibia: Nontender\par Tibial Tubercle: Nontender\par Distal Pole Patella: Nontender\par Quadriceps Tendon: Nontender &  Intact\par Patella Tendon: Nontender &  Intact\par Medial Distal Hamstring/PES: Nontender\par Lateral Distal Hamstring: Nontender & Stable\par Iliotibial Band: Nontender\par Medial Patellofemoral Ligament: Nontender\par Adductor: Nontender\par Proximal GSC-Plantaris: Nontender\par Calf: Supple & Nontender\par Inspection:\par Deformity: No\par Erythema: No\par Ecchymosis: No\par Abrasions: No\par Effusion: No\par Prepatella Bursitis: No\par Neurologic Exam:\par Sensation L4-S1: Grossly Intact\par Motor Exam:\par Quadriceps: 5 out of 5\par Hamstrings: 5 out of 5\par EHL: 5 out of 5\par FHL: 5 out of 5\par TA: 5 out of 5\par GS: 5 out of 5\par Circulatory/Pulses:\par Dorsalis Pedis: 2+\par Posterior Tibialis: 2+\par Additional Pertinent Findings: None\par Contralateral Knee:                           	\par ROM: 0-145 degrees\par Other Pertinent Findings: None\par \par Assessment: The patient is a 56 year old M with end stage left knee OA\par  \par The patient’s condition is acute\par Documents/Results Reviewed Today: none\par Tests/Studies Independently Interpreted Today: none\par Pertinent findings include: 0-125, LJLT, patella grind, Varus instability at 0 and 30deg\par Confounding medical conditions/concerns: none\par \par Plan: Discussed with patient that they will eventually need a TKA. Patient wishes to hold off on TKA discussion. 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\par Additional Instructions: activity modifications\par Follow-Up: prn\par \par The patient's current medication management of their orthopedic diagnosis was reviewed today:\par (1) We discussed a comprehensive treatment plan that included possible pharmaceutical management involving the use of prescription strength medications including but not limited to options such as oral Naprosyn 500mg BID, once daily Meloxicam 15 mg, or 500-650 mg Tylenol versus over the counter oral medications and topical prescription NSAID Pennsaid vs over the counter Voltaren gel.\par (2) There is a moderate risk of morbidity with further treatment, especially from use of prescription strength medications and possible side effects of these medications which include upset stomach with oral medications, skin reactions to topical medications and cardiac/renal issues with long term use.\par (3) I recommended that the patient follow-up with their medical physician to discuss any significant specific potential issues with long term medication use such as interactions with current medications or with exacerbation of underlying medical comorbidities.\par (4) The benefits and risks associated with use of injectable, oral or topical, prescription and over the counter anti-inflammatory medications were discussed with the patient. The patient voiced understanding of the risks including but not limited to bleeding, stroke, kidney dysfunction, heart disease, and were referred to the black box warning label for further information.\par \par I, Michael Orellana, attest that this documentation has been prepared under the direction and in the presence of Provider Dr. Frye\par \par The documentation recorded by the scribe accurately reflects the service I personally performed and the decisions made by me.\par

## 2023-01-16 NOTE — ED ADULT TRIAGE NOTE - NS ED NURSE DIRECT TO ROOM YN
Septic shock due to pseudomonal PNA with lung abscess, morganella bacteremia and kelbsiella UTI.  [facility]25[facility] versus possibly longer pending imaging at 4 weeks (12/23)  - CXR 12/23 - Stable RUL lesion, with new RLL pneumonia   - seen by ID  - completed 6 wks zosyn 1/6  - per d/w ID no further imaging as 6 wks abx sufficient treatment Yes

## 2023-01-20 ENCOUNTER — RX RENEWAL (OUTPATIENT)
Age: 58
End: 2023-01-20

## 2023-01-20 RX ORDER — COLCHICINE 0.6 MG/1
0.6 TABLET ORAL
Qty: 60 | Refills: 1 | Status: ACTIVE | COMMUNITY
Start: 2023-01-20 | End: 1900-01-01

## 2023-02-23 NOTE — PATIENT PROFILE ADULT. - FUNCTIONAL LEVEL PRIOR: TRANSFERRING
[facility]:  0) independent

## 2023-05-01 ENCOUNTER — APPOINTMENT (OUTPATIENT)
Dept: MRI IMAGING | Facility: CLINIC | Age: 58
End: 2023-05-01

## 2023-05-01 ENCOUNTER — APPOINTMENT (OUTPATIENT)
Dept: ORTHOPEDIC SURGERY | Facility: CLINIC | Age: 58
End: 2023-05-01
Payer: COMMERCIAL

## 2023-05-01 ENCOUNTER — FORM ENCOUNTER (OUTPATIENT)
Age: 58
End: 2023-05-01

## 2023-05-01 VITALS — HEIGHT: 71 IN | WEIGHT: 220 LBS | BODY MASS INDEX: 30.8 KG/M2

## 2023-05-01 PROCEDURE — 73080 X-RAY EXAM OF ELBOW: CPT | Mod: RT

## 2023-05-01 PROCEDURE — 99214 OFFICE O/P EST MOD 30 MIN: CPT

## 2023-05-02 ENCOUNTER — APPOINTMENT (OUTPATIENT)
Dept: MRI IMAGING | Facility: CLINIC | Age: 58
End: 2023-05-02
Payer: COMMERCIAL

## 2023-05-02 PROCEDURE — 73221 MRI JOINT UPR EXTREM W/O DYE: CPT | Mod: RT

## 2023-05-02 NOTE — HISTORY OF PRESENT ILLNESS
[de-identified] : The patient is a 57 year old right hand dominant male  who presents today complaining of left knee pain.  \par Date of Injury/Onset: chronic - over 6 months \par Pain:    At Rest: 2/10 \par With Activity:  8/10 \par Mechanism of injury: Pt has a history of knee pain with the left knee.\par This is not a Work Related Injury being treated under Worker's Compensation.\par This is not an athletic injury occurring associated with an interscholastic or organized sports team.\par Quality of symptoms: popping, clicking, pain with activity\par Improves with: rest\par Worse with: overuse, ADL's\par Treatment/Imaging/Studies Since Last Visit: HEP\par 	Reports Available For Review Today: none\par Out of work/sport: currently working\par School/Sport/Position/Occupation:  for [employer]\par Changes since last visit: Patient reports no change in symptoms/pain since last visit.\par Additional Information: [None]

## 2023-05-02 NOTE — IMAGING
[Right] : right elbow [There are no fractures, subluxations or dislocations. No significant abnormalities are seen] : There are no fractures, subluxations or dislocations. No significant abnormalities are seen [No acute displaced fracture or dislocation] : No acute displaced fracture or dislocation [de-identified] : The patient is a well appearing 56 year old M of their stated age.\par Patient ambulates with a normal gait.\par Negative straight leg raise bilateral\par \par Effected Knee: Left                   	\par ROM:  0-125 degrees\par Lachman: Negative\par Pivot Shift: Negative\par Anterior Drawer: Negative\par Posterior Drawer / Sag:Negative\par Varus Stress 0 degrees: unstable\par Varus Stress 30 degrees: unstable \par Valgus Stress 0 degrees: Stable\par Valgus Stress 30 degrees: Stable\par Medial Madi: Negative\par Lateral Madi: Negative\par Patella Glide: 2+\par Patella Apprehension: Negative\par Patella Grind: +\par Palpation:\par Medial Joint Line: Nontender\par Lateral Joint Line: tender\par Medial Collateral Ligament: Nontender\par Lateral Collateral Ligament/PLC: Nontender\par Distal Femur: Nontender\par Proximal Tibia: Nontender\par Tibial Tubercle: Nontender\par Distal Pole Patella: Nontender\par Quadriceps Tendon: Nontender &  Intact\par Patella Tendon: Nontender &  Intact\par Medial Distal Hamstring/PES: Nontender\par Lateral Distal Hamstring: Nontender & Stable\par Iliotibial Band: Nontender\par Medial Patellofemoral Ligament: Nontender\par Adductor: Nontender\par Proximal GSC-Plantaris: Nontender\par Calf: Supple & Nontender\par Inspection:\par Deformity: No\par Erythema: No\par Ecchymosis: No\par Abrasions: No\par Effusion: No\par Prepatella Bursitis: No\par Neurologic Exam:\par Sensation L4-S1: Grossly Intact\par Motor Exam:\par Quadriceps: 5 out of 5\par Hamstrings: 5 out of 5\par EHL: 5 out of 5\par FHL: 5 out of 5\par TA: 5 out of 5\par GS: 5 out of 5\par Circulatory/Pulses:\par Dorsalis Pedis: 2+\par Posterior Tibialis: 2+\par Additional Pertinent Findings: None\par Contralateral Knee:                           	\par ROM: 0-145 degrees\par Other Pertinent Findings: None\par >>>>>>>>>>>>>>>>>>>>>>>>>>>>>>>>>>>>>>>>>>>>>>>>>>>>>>>>>>>>>>>>>>>>>>>>>>>>>\par The patient is a well appearing 58 year old male of their stated age. \par Neck is supple & nontender to palpation. Negative Spurling's test. \par \par Right Shoulder: \par ROM: \par Forward Flexion: 180 degrees \par Abduction: 180 degrees \par ER at 90: 90 degrees \par IR at 90: 45 degrees \par ER at N: 50 degrees \par Motor: \par Abduction: 5 out of 5 \par FSP: 5 out of 5 \par Flexion: 5 out of 5 \par Internal Rotation: 5 out of 5 \par External Rotation: 5 out of 5 \par Provocative Testing: \par Impingement: Negative \par [place]'s: Negative \par Other: N/A \par -----------------------------------\par Effected Elbow: RIGHT \par ROM: \par Flexion: 0-145 degrees \par Supination: 90 degrees, WITH PAIN \par Pronation: 90 degrees \par \par Inspection:\par Erythema: None\par Ecchymosis: None\par Abrasions: None\par Effusion: None\par Deformity: QUESTIONABLE \par \par Palpation:\par Crepitus: None\par Medial Epicondyle/Flexor-Pronator: Nontender\par Lateral Epicondyle/ECRB: Nontender\par Olecranon: Nontender\par Radial Head: Nontender\par Humeral Head: Nontender \par Distal Biceps: TENDER \par Distal Triceps: Nontender\par Flexor-Pronator: Nontender\par Extensor/ECRB: Nontender\par UCL: Nontender\par Pronator Intersection: Nontender\par Ulnar Nerve:  Stable & Nontender\par \par Stress Testing:\par Varus at 0 Degrees: Stable\par Varus at 30 Degrees: Stable\par Valgus at 0 Degrees: Stable\par Valgus at 30 Degrees: Stable\par \par Motor:\par Elbow Flexion: 4+ out of 5\par Elbow Extension: 5 out of 5\par Supination: 4 out of 5, WITH PAIN\par Pronation: 5 out of 5\par Wrist Flexion: 5 out of 5\par Wrist Extension: 5 out of 5\par Interossei: 5 out of 5\par : 5 out of 5\par \par Provocative Testing:\par Milking: Negative\par Moving Valgus Stress: Negative\par Posterolateral R-I: Negative\par Hook Test: POSITIVE \par Resisted Wrist Extension: No pain \par Resisted Index Finger Extension: No Pain\par Resisted Middle Finger Extension: No Pain\par Resisted Wrist Flexion: No Pain\par Resisted Pronation: No Pain\par \par Neurologic Exam:\par Axillary Nerve:  SLT\par Radial Nerve: SLT\par Median Nerve: SLT\par Ulnar Nerve:  SLT\par Other:  N/A\par Vascular Exam:\par Radial Pulse: 2+\par Ulnar Pulse: 2+\par Capillary Refill: <2 Seconds\par Other Exams: None\par Pertinent Contralateral Elbow Findings: None\par \par \par Assessment: The patient is a 56 year old M with end stage left knee OA and right elbow pain consistent with possible distal biceps rupture.\par  \par The patient’s condition is acute\par Documents/Results Reviewed Today: X-Ray right elbow \par Tests/Studies Independently Interpreted Today: X-Ray right elbow is unremarkable \par Pertinent findings include: KNEE: 0-125, LJLT, patella grind, Varus instability at 0 and 30deg. ELBOW: distal biceps tenderness with questionable deformity, positive hook test, 4+/5 flexion strength, pain with supination, 4/5 supination \par Confounding medical conditions/concerns: none\par \par Plan: Patient understands that he is a candidate for a TKA and will consider surgical intervention at another time. For his elbow however, due to worsening pain and instability with mechanical symptoms, patient will obtain a STAT MRI right elbow to evaluate for distal biceps tendon rupture. Take OTC antiinflammatories as needed - use as directed. Modify activity as discussed. 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  Based on our extensive discussion, the patient declined prescription medication and will use over the counter Advil, Alleve, Voltaren Gel or Tylenol as directed.\par (2) There is a moderate risk of morbidity with further treatment, especially from use of prescription strength medications and possible side effects of these medications which include upset stomach with oral medications, skin reactions to topical medications and cardiac/renal issues with long term use.\par (3) I recommended that the patient follow-up with their medical physician to discuss any significant specific potential issues with long term medication use such as interactions with current medications or with exacerbation of underlying medical comorbidities.\par (4) The benefits and risks associated with use of injectable, oral or topical, prescription and over the counter anti-inflammatory medications were discussed with the patient. The patient voiced understanding of the risks including but not limited to bleeding, stroke, kidney dysfunction, heart disease, and were referred to the black box warning label for further information.\par \par I, Carolina Quiñonez, attest that this documentation has been prepared under the direction and in the presence of Provider Dr. Raza Frye.\par \par \par The documentation recorded by the scribe accurately reflects the services I, Dr. Raza Frye, personally performed and the decisions made by me.\par

## 2023-05-04 ENCOUNTER — APPOINTMENT (OUTPATIENT)
Dept: ORTHOPEDIC SURGERY | Facility: CLINIC | Age: 58
End: 2023-05-04

## 2023-10-09 ENCOUNTER — APPOINTMENT (OUTPATIENT)
Dept: ORTHOPEDIC SURGERY | Facility: CLINIC | Age: 58
End: 2023-10-09
Payer: COMMERCIAL

## 2023-10-16 ENCOUNTER — APPOINTMENT (OUTPATIENT)
Dept: ORTHOPEDIC SURGERY | Facility: CLINIC | Age: 58
End: 2023-10-16
Payer: COMMERCIAL

## 2023-10-16 VITALS — WEIGHT: 220 LBS | BODY MASS INDEX: 30.8 KG/M2 | HEIGHT: 71 IN

## 2023-10-16 DIAGNOSIS — M25.521 PAIN IN RIGHT ELBOW: ICD-10-CM

## 2023-10-16 PROCEDURE — 99214 OFFICE O/P EST MOD 30 MIN: CPT

## 2024-04-11 ENCOUNTER — APPOINTMENT (OUTPATIENT)
Dept: ORTHOPEDIC SURGERY | Facility: CLINIC | Age: 59
End: 2024-04-11
Payer: COMMERCIAL

## 2024-04-11 VITALS — HEIGHT: 71 IN | BODY MASS INDEX: 30.8 KG/M2 | WEIGHT: 220 LBS

## 2024-04-11 DIAGNOSIS — M17.12 UNILATERAL PRIMARY OSTEOARTHRITIS, LEFT KNEE: ICD-10-CM

## 2024-04-11 DIAGNOSIS — M25.562 PAIN IN LEFT KNEE: ICD-10-CM

## 2024-04-11 PROCEDURE — 99213 OFFICE O/P EST LOW 20 MIN: CPT

## 2024-04-11 NOTE — IMAGING
[Right] : right elbow [There are no fractures, subluxations or dislocations. No significant abnormalities are seen] : There are no fractures, subluxations or dislocations. No significant abnormalities are seen [No acute displaced fracture or dislocation] : No acute displaced fracture or dislocation [de-identified] : The patient is a well appearing 56 year old M of their stated age. Patient ambulates with a normal gait. Negative straight leg raise bilateral  Effected Knee: LEFT  ROM:  0-125 degrees Lachman: Negative Pivot Shift: Negative Anterior Drawer: Negative Posterior Drawer / Sag:Negative Varus Stress 0 degrees: UNSTABLE Varus Stress 30 degrees: UNSTABLE  Valgus Stress 0 degrees: Stable Valgus Stress 30 degrees: Stable Medial Madi: Negative Lateral Madi: Negative Patella Glide: 2+ Patella Apprehension: Negative Patella Grind: POSITIVE   Palpation: Medial Joint Line: Nontender Lateral Joint Line: TENDER  Medial Collateral Ligament: Nontender Lateral Collateral Ligament/PLC: Nontender Distal Femur: Nontender Proximal Tibia: Nontender Tibial Tubercle: Nontender Distal Pole Patella: Nontender Quadriceps Tendon: Nontender &  Intact Patella Tendon: Nontender &  Intact Medial Distal Hamstring/PES: Nontender Lateral Distal Hamstring: Nontender & Stable Iliotibial Band: Nontender Medial Patellofemoral Ligament: Nontender Adductor: Nontender Proximal GSC-Plantaris: Nontender Calf: Supple & Nontender  Inspection: Deformity: No Erythema: No Ecchymosis: No Abrasions: No Effusion: No Prepatella Bursitis: No Neurologic Exam: Sensation L4-S1: Grossly Intact Motor Exam: Quadriceps: 5 out of 5 Hamstrings: 5 out of 5 EHL: 5 out of 5 FHL: 5 out of 5 TA: 5 out of 5 GS: 5 out of 5 Circulatory/Pulses: Dorsalis Pedis: 2+ Posterior Tibialis: 2+  Additional Pertinent Findings: None Contralateral Knee:                           	 ROM: 0-145 degrees Other Pertinent Findings: None  Assessment: The patient is a 56 year old male with end stage left knee OA.  The patient's condition is chronic.  Documents/Results Reviewed Today: None   Tests/Studies Independently Interpreted Today: None   Pertinent findings include: 0-125, LJLT, +patellofemoral grind, Varus instability at 0 and 30 degrees.   Confounding medical conditions/concerns: none  Plan: Again, discussed treatment options for the patient's knee arthritis, both operative vs non-operative. Patient will continue HEP to focus on strengthening. Recommended Reparel sleeve. Discussed taking OTC anti-inflammatories as needed - use as directed. The patient is aware and understands that he is a candidate for a total knee arthroplasty. Recommended he follow up with a joint replacement specialist. Modify activity as discussed.   Tests Ordered: None Prescription Medications Ordered: Discussed appropriate use of OTC anti-inflammatories and topical analgesics (including but not limited to Aleve, Advil, Tylenol, Motrin, Ibuprofen, Voltaren gel, etc.)  Braces/DME Ordered: None Activity/Work/Sports Status: [employer] Additional Instructions: activity modifications Follow-Up: PRN  The patient's current medication management of their orthopedic diagnosis was reviewed today: (1) We discussed a comprehensive treatment plan that included possible pharmaceutical management involving the use of prescription strength medications including but not limited to options such as oral Naprosyn 500mg BID, once daily Meloxicam 15 mg, or 500-650 mg Tylenol versus over the counter oral medications and topical prescription NSAID Pennsaid vs over the counter Voltaren gel.  Based on our extensive discussion, the patient declined prescription medication and will use over the counter Advil, Alleve, Voltaren Gel or Tylenol as directed. (2) There is a moderate risk of morbidity with further treatment, especially from use of prescription strength medications and possible side effects of these medications which include upset stomach with oral medications, skin reactions to topical medications and cardiac/renal issues with long term use. (3) I recommended that the patient follow-up with their medical physician to discuss any significant specific potential issues with long term medication use such as interactions with current medications or with exacerbation of underlying medical comorbidities. (4) The benefits and risks associated with use of injectable, oral or topical, prescription and over the counter anti-inflammatory medications were discussed with the patient. The patient voiced understanding of the risks including but not limited to bleeding, stroke, kidney dysfunction, heart disease, and were referred to the black box warning label for further information.  Carmina ANDRES attest that this documentation has been prepared under the direction and in the presence of Provider Dr. Raza Frye.   The documentation recorded by the scribe accurately reflects the service Neda ANDRES PA-C, personally performed and the decisions made by me. The patient was seen by me under the direct supervision of Dr. Raza Frye.

## 2024-04-11 NOTE — HISTORY OF PRESENT ILLNESS
[de-identified] : The patient is a 57 year old right hand dominant male  who presents today complaining of left knee pain.   Date of Injury/Onset: chronic - over 6 months  Pain:    At Rest: 0-2/10  With Activity:  7-10/10  Mechanism of injury: Pt has a history of knee pain with the left knee. This is not a Work Related Injury being treated under Worker's Compensation. This is not an athletic injury occurring associated with an interscholastic or organized sports team. Quality of symptoms: popping, clicking, pain with activity Improves with: rest, HEP Worse with: overuse, ADL's Treatment/Imaging/Studies Since Last Visit: HEP 	Reports Available For Review Today: none Out of work/sport: currently working School/Sport/Position/Occupation:  for [employer] Changes since last visit: Patient reports no change in symptoms/pain since last visit. Additional Information: [None]

## 2024-05-23 NOTE — ED PROVIDER NOTE - PATIENT PORTAL LINK FT
24-May-2024 01:10 You can access the FollowMyHealth Patient Portal offered by [facility] by registering at the following website: [email or web address]. By joining [facility]’s FollowMyHealth portal, you will also be able to view your health information using other applications (apps) compatible with our system.

## 2024-05-29 ENCOUNTER — APPOINTMENT (OUTPATIENT)
Dept: GASTROENTEROLOGY | Facility: CLINIC | Age: 59
End: 2024-05-29
Payer: COMMERCIAL

## 2024-05-29 VITALS
DIASTOLIC BLOOD PRESSURE: 90 MMHG | SYSTOLIC BLOOD PRESSURE: 158 MMHG | BODY MASS INDEX: 30.8 KG/M2 | WEIGHT: 220 LBS | HEIGHT: 71 IN

## 2024-05-29 DIAGNOSIS — Z71.9 COUNSELING, UNSPECIFIED: ICD-10-CM

## 2024-05-29 DIAGNOSIS — Z12.11 ENCOUNTER FOR SCREENING FOR MALIGNANT NEOPLASM OF COLON: ICD-10-CM

## 2024-05-29 DIAGNOSIS — Z80.0 ENCOUNTER FOR SCREENING FOR MALIGNANT NEOPLASM OF COLON: ICD-10-CM

## 2024-05-29 PROCEDURE — 99203 OFFICE O/P NEW LOW 30 MIN: CPT

## 2024-05-29 RX ORDER — NEBIVOLOL HYDROCHLORIDE 5 MG/1
5 TABLET ORAL
Refills: 0 | Status: ACTIVE | COMMUNITY

## 2024-05-29 RX ORDER — SODIUM SULFATE, POTASSIUM SULFATE AND MAGNESIUM SULFATE 1.6; 3.13; 17.5 G/177ML; G/177ML; G/177ML
17.5-3.13-1.6 SOLUTION ORAL
Qty: 1 | Refills: 0 | Status: ACTIVE | COMMUNITY
Start: 2024-05-29 | End: 1900-01-01

## 2024-05-29 NOTE — PHYSICAL EXAM

## 2024-05-29 NOTE — ASSESSMENT
[FreeTextEntry1] : 58 yo male with family history of colon cancer. Arrange colonoscopy after Cardiology clearance to hold Plavix for procedure.

## 2024-05-29 NOTE — HISTORY OF PRESENT ILLNESS
[FreeTextEntry1] : Mr. WILLIAM STEINERT is a 59 year old male with family history of colon cancer.  Patient's mother  from colon cancer several years ago.  Patient had last colonoscopy in 2016.  Patient does note some gluten intolerance and has had some intermittent abdominal discomfort.  Patient has no complaints of heartburn.  Patient is status post coronary stent and is on Plavix.

## 2024-05-29 NOTE — REASON FOR VISIT
[Consultation] : a consultation visit [FreeTextEntry1] : family history of colon cancer and personal history of colon polyp

## 2024-11-01 ENCOUNTER — APPOINTMENT (OUTPATIENT)
Dept: ORTHOPEDIC SURGERY | Facility: CLINIC | Age: 59
End: 2024-11-01
Payer: COMMERCIAL

## 2024-11-01 VITALS — WEIGHT: 220 LBS | BODY MASS INDEX: 30.8 KG/M2 | HEIGHT: 71 IN

## 2024-11-01 DIAGNOSIS — Z71.9 COUNSELING, UNSPECIFIED: ICD-10-CM

## 2024-11-01 DIAGNOSIS — M25.562 PAIN IN LEFT KNEE: ICD-10-CM

## 2024-11-01 DIAGNOSIS — M17.12 UNILATERAL PRIMARY OSTEOARTHRITIS, LEFT KNEE: ICD-10-CM

## 2024-11-01 PROCEDURE — 99213 OFFICE O/P EST LOW 20 MIN: CPT

## 2024-11-14 ENCOUNTER — APPOINTMENT (OUTPATIENT)
Dept: GASTROENTEROLOGY | Facility: AMBULATORY MEDICAL SERVICES | Age: 59
End: 2024-11-14
Payer: COMMERCIAL

## 2024-11-14 PROCEDURE — 45378 DIAGNOSTIC COLONOSCOPY: CPT

## 2025-04-07 ENCOUNTER — APPOINTMENT (OUTPATIENT)
Dept: ORTHOPEDIC SURGERY | Facility: CLINIC | Age: 60
End: 2025-04-07
Payer: COMMERCIAL

## 2025-04-07 VITALS — HEIGHT: 70 IN | BODY MASS INDEX: 31.5 KG/M2 | WEIGHT: 220 LBS

## 2025-04-07 DIAGNOSIS — M25.562 PAIN IN LEFT KNEE: ICD-10-CM

## 2025-04-07 DIAGNOSIS — M17.12 UNILATERAL PRIMARY OSTEOARTHRITIS, LEFT KNEE: ICD-10-CM

## 2025-04-07 PROCEDURE — 99213 OFFICE O/P EST LOW 20 MIN: CPT
